# Patient Record
Sex: FEMALE | Race: WHITE | Employment: STUDENT | URBAN - METROPOLITAN AREA
[De-identification: names, ages, dates, MRNs, and addresses within clinical notes are randomized per-mention and may not be internally consistent; named-entity substitution may affect disease eponyms.]

---

## 2023-01-06 ENCOUNTER — OFFICE VISIT (OUTPATIENT)
Dept: PRIMARY CARE CLINIC | Age: 23
End: 2023-01-06
Payer: COMMERCIAL

## 2023-01-06 VITALS
HEART RATE: 83 BPM | HEIGHT: 66 IN | BODY MASS INDEX: 41.78 KG/M2 | SYSTOLIC BLOOD PRESSURE: 108 MMHG | WEIGHT: 260 LBS | OXYGEN SATURATION: 98 % | TEMPERATURE: 98.7 F | DIASTOLIC BLOOD PRESSURE: 73 MMHG

## 2023-01-06 DIAGNOSIS — M79.89 MASS OF SOFT TISSUE OF LEFT UPPER EXTREMITY: Primary | ICD-10-CM

## 2023-01-06 DIAGNOSIS — L68.0 HIRSUTISM: ICD-10-CM

## 2023-01-06 PROCEDURE — 99203 OFFICE O/P NEW LOW 30 MIN: CPT | Performed by: FAMILY MEDICINE

## 2023-01-06 RX ORDER — SPIRONOLACTONE 25 MG/1
25 TABLET ORAL DAILY
Qty: 30 TABLET | Refills: 5 | Status: SHIPPED | OUTPATIENT
Start: 2023-01-06

## 2023-01-06 SDOH — ECONOMIC STABILITY: FOOD INSECURITY: WITHIN THE PAST 12 MONTHS, YOU WORRIED THAT YOUR FOOD WOULD RUN OUT BEFORE YOU GOT MONEY TO BUY MORE.: NEVER TRUE

## 2023-01-06 SDOH — ECONOMIC STABILITY: FOOD INSECURITY: WITHIN THE PAST 12 MONTHS, THE FOOD YOU BOUGHT JUST DIDN'T LAST AND YOU DIDN'T HAVE MONEY TO GET MORE.: NEVER TRUE

## 2023-01-06 ASSESSMENT — PATIENT HEALTH QUESTIONNAIRE - PHQ9
SUM OF ALL RESPONSES TO PHQ QUESTIONS 1-9: 2
SUM OF ALL RESPONSES TO PHQ QUESTIONS 1-9: 2
SUM OF ALL RESPONSES TO PHQ9 QUESTIONS 1 & 2: 2
1. LITTLE INTEREST OR PLEASURE IN DOING THINGS: 0
SUM OF ALL RESPONSES TO PHQ QUESTIONS 1-9: 2
SUM OF ALL RESPONSES TO PHQ QUESTIONS 1-9: 2
2. FEELING DOWN, DEPRESSED OR HOPELESS: 2

## 2023-01-06 ASSESSMENT — SOCIAL DETERMINANTS OF HEALTH (SDOH): HOW HARD IS IT FOR YOU TO PAY FOR THE VERY BASICS LIKE FOOD, HOUSING, MEDICAL CARE, AND HEATING?: NOT HARD AT ALL

## 2023-01-06 NOTE — PROGRESS NOTES
[facility]  [facility] PRIMARY CARE  [address] [place] [address]  Dept: [phone]  Dept Fax: [phone]     1/6/2023      Matilde K Nanmainetommy   2000     Chief Complaint   Patient presents with    New Patient    Mass     Patient would like a referral for lump on left arm. Patient states lump is hard but not painful.       HPI    Pt comes in today for new patient visit.     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 Graduated from [employer] and recently moved to the area. She has no PMH.     C/o lump to right arm just above elbow. Just noticed in November. No change in size. No pain. Tattoo over that area was done in Sept 17th. Has a h/o of a similar type cyst to right upper abdomen that she had removed in 2022.     Patient has never had a pap smear. C/o unwanted facial hair along chin and lower face. Saw PCP last year and had labs checked but not testosterone. FSH, LH, prolactin and DHEA normal. Is wanting to start working on weight loss and would like referral to see weight loss provider/dietician.     PHQ-9 Total Score: 2 (1/6/2023 10:19 AM)       Prior to Visit Medications    Medication Sig Taking? Authorizing Provider   sertraline (ZOLOFT) 50 MG tablet TAKE 1 TABLET BY MOUTH DAILY  Oliverio Barrett, DO        History reviewed. No pertinent past medical history.     Social History     Tobacco Use    Smoking status: Never    Smokeless tobacco: Never   Substance Use Topics    Alcohol use: Yes    Drug use: Never        Past Surgical History:   Procedure Laterality Date    CYST REMOVAL      right upper abdomen        No Known Allergies     Family History   Problem Relation Age of Onset    Other Mother         epilepsy    No Known Problems Father     Cancer Maternal Aunt         NMSC        Patient's past medical history, surgical history, family history, medications, and allergies  were all reviewed and updated as appropriate today.    Review of Systems   Constitutional:   Negative for fever. Respiratory:  Negative for cough. Musculoskeletal:  Negative for arthralgias. Skin:  Negative for color change, rash and wound. /73   Pulse 83   Temp 98.7 °F (37.1 °C)   Ht 5' 5.5\" (1.664 m)   Wt 260 lb (117.9 kg)   SpO2 98%   BMI 42.61 kg/m²      Physical Exam  Vitals reviewed. Constitutional:       General: She is not in acute distress. Appearance: Normal appearance. She is well-developed. She is not ill-appearing or toxic-appearing. Eyes:      General: No scleral icterus. Conjunctiva/sclera: Conjunctivae normal.   Neck:      Thyroid: No thyromegaly. Cardiovascular:      Rate and Rhythm: Normal rate and regular rhythm. Heart sounds: No murmur heard. Pulmonary:      Effort: Pulmonary effort is normal. No respiratory distress. Breath sounds: Normal breath sounds. Abdominal:      General: There is no distension. Palpations: Abdomen is soft. Tenderness: There is no abdominal tenderness. Musculoskeletal:      Left forearm: Deformity (~ 4 x 3 cm soft tissue mass to JEF DAWKINS, somewhat firm) present. Cervical back: Neck supple. Right lower leg: No edema. Left lower leg: No edema. Lymphadenopathy:      Cervical: No cervical adenopathy. Skin:     General: Skin is warm and dry. Capillary Refill: Capillary refill takes less than 2 seconds. Neurological:      General: No focal deficit present. Mental Status: She is alert. Mental status is at baseline. Psychiatric:         Mood and Affect: Mood normal.       Assessment:  Encounter Diagnoses   Name Primary? Mass of soft tissue of left upper extremity Yes    Hirsutism     BMI 40.0-44.9, adult (HCC)        Plan:    - Recommend surgical referral.   - Check testosterone level. Trial spironolactone.    - Weight mgmt referral.     New Prescriptions    SPIRONOLACTONE (ALDACTONE) 25 MG TABLET    Take 1 tablet by mouth daily        Orders Placed This Encounter Procedures    Lenka Waldron MD, Hand Surgery (Hand, Wrist, Upper Extremity), Chao Villeda MD, Bariatric Surgery, (Virtual Visits Only)          Return in about 6 months (around 7/6/2023) for Annual physical.         Rolando Barnhart DO

## 2023-01-06 NOTE — PATIENT INSTRUCTIONS
[facility] Laboratory Locations - No appointment necessary. @ indicates the location is open Saturdays in addition to Monday through Friday. Call your preferred location for test preparation, business hours and other information you need. [employer] accepts [employer]. [facility]    @ [place] [facility] Svcs. 3 [facility] [address] [place] [address]. Rudy, [address]   Ph: [phone] [place] [facility] Svcs. [address]   Ph: [phone]  @ [place] [facility] Svcs. [address] [facility]   [facility]   Ph: [phone]    [facility] Svcs. [address]iza [address]   Ph: [phone] @ [place] [facility] Svcs. [address]  Ph: [phone] @ [place] [facility] Svcs. [address] [facility] [address] [facility] [address] Joni [address]   Ph: [phone]     [facility] Svcs. [address]  Ph: [phone]    [place]   @ [place] [facility] Svcs. [address] [address]   Ph: [phone] [facility] Med. Office Bldg. [address] [facility], [address]  Ph: [address] [place] [address]   [place] [address] Lab Svcs[address] 54 [facility]   Ph: [phone] [facility]. Lab Svcs.   [address] [facility], [address]   Ph: [phone]

## 2023-01-09 ASSESSMENT — ENCOUNTER SYMPTOMS
COLOR CHANGE: 0
COUGH: 0

## 2023-01-13 ENCOUNTER — OFFICE VISIT (OUTPATIENT)
Dept: ORTHOPEDIC SURGERY | Age: 23
End: 2023-01-13

## 2023-01-13 VITALS — WEIGHT: 260 LBS | HEIGHT: 66 IN | BODY MASS INDEX: 41.78 KG/M2

## 2023-01-13 DIAGNOSIS — R22.32 ARM MASS, LEFT: Primary | ICD-10-CM

## 2023-01-13 SDOH — HEALTH STABILITY: PHYSICAL HEALTH: ON AVERAGE, HOW MANY DAYS PER WEEK DO YOU ENGAGE IN MODERATE TO STRENUOUS EXERCISE (LIKE A BRISK WALK)?: 4 DAYS

## 2023-01-13 SDOH — HEALTH STABILITY: PHYSICAL HEALTH: ON AVERAGE, HOW MANY MINUTES DO YOU ENGAGE IN EXERCISE AT THIS LEVEL?: 30 MIN

## 2023-01-13 ASSESSMENT — SOCIAL DETERMINANTS OF HEALTH (SDOH)
WITHIN THE LAST YEAR, HAVE TO BEEN RAPED OR FORCED TO HAVE ANY KIND OF SEXUAL ACTIVITY BY YOUR PARTNER OR EX-PARTNER?: NO
WITHIN THE LAST YEAR, HAVE YOU BEEN HUMILIATED OR EMOTIONALLY ABUSED IN OTHER WAYS BY YOUR PARTNER OR EX-PARTNER?: NO
WITHIN THE LAST YEAR, HAVE YOU BEEN KICKED, HIT, SLAPPED, OR OTHERWISE PHYSICALLY HURT BY YOUR PARTNER OR EX-PARTNER?: NO
WITHIN THE LAST YEAR, HAVE YOU BEEN AFRAID OF YOUR PARTNER OR EX-PARTNER?: NO

## 2023-01-13 NOTE — PROGRESS NOTES
Jv Parks  [id number]  January 13, 2023    Chief Complaint   Patient presents with    Arm Pain     left       History: The patient is a 68-year-old female who is here for evaluation of her left arm. The patient reportedly developed a mass over the mid arm/humerus back in November. She cannot recall a specific injury. She woke up 1 morning and noted swelling. She denies any fever or chills. She reports minimal pain. This is a consult from [facility],  for a mass of the left arm. The patient's  past medical history, medications, allergies,  family history, social history, and have been reviewed, and dated and are recorded in the chart. Pertinent items are noted in HPI. Review of systems reviewed from Pertinent History Form dated on 1/13 and available in the patient's chart under the Media tab. Vitals:  Ht 5' 5.5\" (1.664 m)   Wt 260 lb (117.9 kg)   BMI 42.61 kg/m²     Physical: On examination today, the patient is alert and oriented x3. The patient has full range of motion of her left elbow, wrist and hand. She has full range of motion of the left shoulder. The patient does have a palpable mass over the mid upper arm which measures approximately 3 cm x 3 cm. The mass is in the subcutaneous tissues over the biceps. The mass is somewhat mobile. The margins of the mass are mildly tender. There is no evidence of erythema or warmth. Left elbow strength is 5/5. Examination of the skin reveals no lesions or ulcerations. The patient is neurovascularly intact distally. X-rays: 2 views of the left elbow/humerus obtained in the office today were extensively reviewed. There are no lytic or blastic lesions within the bone. There is no evidence of fracture or dislocation. 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 The patient will follow up with me after the MRI scan is completed.   We will then discuss our options. She was encouraged to modify her activities. Orders Placed This Encounter   Procedures    XR ELBOW LEFT (2 VIEWS)     Standing Status:   Future     Number of Occurrences:   1     Standing Expiration Date:   1/13/2024     Scheduling Instructions:      Rm 24      Mass on distal bicep     Order Specific Question:   Reason for exam:     Answer:   pain    MRI HUMERUS LEFT W CONTRAST     Standing Status:   Future     Standing Expiration Date:   1/13/2024     Scheduling Instructions:      [facility]     Order Specific Question:   STAT Creatinine as needed:     Answer:   Yes     Order Specific Question:   Reason for exam:     Answer:   to evaluate mass over distal third of bicep     Order Specific Question:   What is the sedation requirement?      Answer:   None

## 2023-01-16 ENCOUNTER — TELEPHONE (OUTPATIENT)
Dept: ORTHOPEDIC SURGERY | Age: 23
End: 2023-01-16

## 2023-01-16 NOTE — TELEPHONE ENCOUNTER
I left a message to inform the patient her MRI was approved. She was instructed to call Gemma vargas at [phone]. She will follow up in the office after the MRI to review results.      Adam Stark MA  P [facility] Clinical Support  MRI LEFT HUMERUS approved Auth# [id number]

## 2023-01-24 ENCOUNTER — HOSPITAL ENCOUNTER (OUTPATIENT)
Dept: MRI IMAGING | Age: 23
Discharge: HOME | End: 2023-01-24
Payer: COMMERCIAL

## 2023-01-24 ENCOUNTER — TELEPHONE (OUTPATIENT)
Dept: ORTHOPEDIC SURGERY | Age: 23
End: 2023-01-24

## 2023-01-24 DIAGNOSIS — R22.32 ARM MASS, LEFT: Primary | ICD-10-CM

## 2023-01-24 DIAGNOSIS — R22.32 ARM MASS, LEFT: ICD-10-CM

## 2023-01-24 PROCEDURE — 6360000004 HC RX CONTRAST MEDICATION: Performed by: ORTHOPAEDIC SURGERY

## 2023-01-24 PROCEDURE — A9579 GAD-BASE MR CONTRAST NOS,1ML: HCPCS | Performed by: ORTHOPAEDIC SURGERY

## 2023-01-24 PROCEDURE — 73219 MRI UPPER EXTREMITY W/DYE: CPT

## 2023-01-24 RX ADMIN — GADOTERIDOL 20 ML: 279.3 INJECTION, SOLUTION INTRAVENOUS at 12:04

## 2023-01-24 NOTE — TELEPHONE ENCOUNTER
I left a message for the patient to call back to discuss her MRI results. Dr. Lolis Villegas reviewed the MRI and would like the patient to see Dr. Neelam Isaacs at [facility] ([phone]) for further evaluation due to some concerns on the MRI.

## 2023-02-06 ENCOUNTER — OFFICE VISIT (OUTPATIENT)
Dept: PRIMARY CARE CLINIC | Age: 23
End: 2023-02-06
Payer: COMMERCIAL

## 2023-02-06 VITALS
SYSTOLIC BLOOD PRESSURE: 100 MMHG | HEART RATE: 77 BPM | OXYGEN SATURATION: 98 % | TEMPERATURE: 97.8 F | BODY MASS INDEX: 41.91 KG/M2 | HEIGHT: 66 IN | DIASTOLIC BLOOD PRESSURE: 68 MMHG | WEIGHT: 260.8 LBS

## 2023-02-06 DIAGNOSIS — Z01.818 PRE-OP EXAM: ICD-10-CM

## 2023-02-06 DIAGNOSIS — M79.89 MASS OF SOFT TISSUE OF LEFT UPPER EXTREMITY: Primary | ICD-10-CM

## 2023-02-06 PROCEDURE — 99213 OFFICE O/P EST LOW 20 MIN: CPT | Performed by: FAMILY MEDICINE

## 2023-02-06 SDOH — ECONOMIC STABILITY: FOOD INSECURITY: WITHIN THE PAST 12 MONTHS, THE FOOD YOU BOUGHT JUST DIDN'T LAST AND YOU DIDN'T HAVE MONEY TO GET MORE.: NEVER TRUE

## 2023-02-06 SDOH — ECONOMIC STABILITY: FOOD INSECURITY: WITHIN THE PAST 12 MONTHS, YOU WORRIED THAT YOUR FOOD WOULD RUN OUT BEFORE YOU GOT MONEY TO BUY MORE.: NEVER TRUE

## 2023-02-06 SDOH — ECONOMIC STABILITY: HOUSING INSECURITY
IN THE LAST 12 MONTHS, WAS THERE A TIME WHEN YOU DID NOT HAVE A STEADY PLACE TO SLEEP OR SLEPT IN A SHELTER (INCLUDING NOW)?: NO

## 2023-02-06 SDOH — ECONOMIC STABILITY: INCOME INSECURITY: HOW HARD IS IT FOR YOU TO PAY FOR THE VERY BASICS LIKE FOOD, HOUSING, MEDICAL CARE, AND HEATING?: NOT HARD AT ALL

## 2023-02-06 ASSESSMENT — ENCOUNTER SYMPTOMS
DIARRHEA: 0
NAUSEA: 0
SHORTNESS OF BREATH: 0
ABDOMINAL PAIN: 0
VOMITING: 0

## 2023-02-06 NOTE — PROGRESS NOTES
[address] [facility] [address] PRIMARY CARE  [address] [facility] [address]  Dept: [phone]  Dept Fax: [phone]     2/6/2023      Venu Coello   2000     Chief Complaint   Patient presents with    Pre-op Exam     Ortho    2/13/2023   POSSIBLE EXCISION LEFT ARM MASS       HPI    Pt comes in today for pre-op visit. Surgeon - Dr. Jeremias Gomez at [facility] [phone]    Diagnosis: Left arm mass    Planned surgery: Biopsy, possible excision    Date scheduled: 2/13/23    Anesthesia: General    Blood thinner/ASA/NSAIDs: None    H/o anesthesia complications: Has never had general anesthesia    Pre-operative testing: None ordered    H/o cardiac or pulmonary disease: None    Functional capacity: 8-10  ( <4 MET = Self care, ADLs, walking indoors, 3-5 MET = Light work around the house, climb stairs or walk up hill, 5-7 METs = Heavy cleaning around the house, moderate yard work, run a short distance, 8-10 METs = Moderate to strenuous recreational sports, daily or prolonged exercise)       Impression   1. Heterogeneous enhancing mass lesion measuring 2.8 x 2.6 x 1.8 cm subjacent   to the soft tissue marker along the anterior and mid to distal left upper arm   in the subcutaneous fat overlying the distal biceps muscle with loss of the   intervening fat plane. Differential considerations include malignant fibrous   histiocytoma or synovial sarcoma. Consultation with an orthopedic oncologist   is recommended prior to any intervention or sampling. 2. No acute osseous abnormality. The findings were sent to the Radiology Results [address] at 12:43   pm on 1/24/2023 to be communicated to a licensed caregiver. No data recorded     Prior to Visit Medications    Medication Sig Taking? Authorizing Provider   spironolactone (ALDACTONE) 25 MG tablet Take 1 tablet by mouth daily  [address], DO        History reviewed. No pertinent past medical history. Social History     Tobacco Use    Smoking status: Never    Smokeless tobacco: Never   Substance Use Topics    Alcohol use: Yes    Drug use: Never        Past Surgical History:   Procedure Laterality Date    CYST REMOVAL      right upper abdomen        No Known Allergies     Family History   Problem Relation Age of Onset    Other Mother         epilepsy    No Known Problems Father     Cancer Maternal Aunt         NMSC        Patient's past medical history, surgical history, family history, medications, and allergies  were all reviewed and updated as appropriate today. Review of Systems   Constitutional:  Negative for chills, fever and unexpected weight change. Respiratory:  Negative for shortness of breath. Cardiovascular:  Negative for chest pain. Gastrointestinal:  Negative for abdominal pain, diarrhea, nausea and vomiting. Neurological:  Negative for syncope. /68   Pulse 77   Temp 97.8 °F (36.6 °C)   Ht 5' 5.5\" (1.664 m)   Wt 260 lb 12.8 oz (118.3 kg)   LMP  (LMP Unknown) Comment: Last week of January 2023  SpO2 98%   BMI 42.74 kg/m²      Physical Exam  Vitals reviewed. Constitutional:       General: She is not in acute distress. Appearance: Normal appearance. She is well-developed. She is not ill-appearing or toxic-appearing. Eyes:      General: No scleral icterus. Conjunctiva/sclera: Conjunctivae normal.   Neck:      Thyroid: No thyromegaly. Cardiovascular:      Rate and Rhythm: Normal rate and regular rhythm. Heart sounds: No murmur heard. Pulmonary:      Effort: Pulmonary effort is normal. No respiratory distress. Breath sounds: Normal breath sounds. Musculoskeletal:      Cervical back: Neck supple. Right lower leg: No edema. Left lower leg: No edema. Lymphadenopathy:      Cervical: No cervical adenopathy. Skin:     General: Skin is warm and dry. Capillary Refill: Capillary refill takes less than 2 seconds.    Neurological: General: No focal deficit present. Mental Status: She is alert. Mental status is at baseline. Psychiatric:         Mood and Affect: Mood normal.       Assessment:  Encounter Diagnoses   Name Primary? Mass of soft tissue of left upper extremity Yes    Pre-op exam        Plan:    - Patient is medically cleared for planned surgery. New Prescriptions    No medications on file        No orders of the defined types were placed in this encounter. Return if symptoms worsen or fail to improve.          [address], DO

## 2023-02-07 ENCOUNTER — TELEPHONE (OUTPATIENT)
Dept: PRIMARY CARE CLINIC | Age: 23
End: 2023-02-07

## 2023-02-07 NOTE — TELEPHONE ENCOUNTER
Update: Morrell Lesches from [address] [place] [address] office called back and informed me that patient does not need any testing. She informed me that all we need to send them is her AVS.    Fax:[phone].

## 2023-02-07 NOTE — TELEPHONE ENCOUNTER
Called Dr. Nydia De Oliveira per Dr. Giulia Cali to see if patient needed any testing for pre op. I spoke with a representative, Radha Molina, who informed me that he will send a message to the clinical team and see if patient needs testing for procedure.

## 2023-02-08 PROBLEM — M79.89 MASS OF SOFT TISSUE OF LEFT UPPER EXTREMITY: Status: ACTIVE | Noted: 2023-02-08

## 2023-05-31 ENCOUNTER — OFFICE VISIT (OUTPATIENT)
Dept: PRIMARY CARE CLINIC | Age: 23
End: 2023-05-31
Payer: COMMERCIAL

## 2023-05-31 VITALS
TEMPERATURE: 98.6 F | HEART RATE: 70 BPM | SYSTOLIC BLOOD PRESSURE: 100 MMHG | DIASTOLIC BLOOD PRESSURE: 67 MMHG | WEIGHT: 255.6 LBS | BODY MASS INDEX: 41.89 KG/M2

## 2023-05-31 DIAGNOSIS — Z01.419 WELL WOMAN EXAM WITH ROUTINE GYNECOLOGICAL EXAM: Primary | ICD-10-CM

## 2023-05-31 PROBLEM — M79.89 MASS OF SOFT TISSUE OF LEFT UPPER EXTREMITY: Status: RESOLVED | Noted: 2023-02-08 | Resolved: 2023-05-31

## 2023-05-31 PROCEDURE — 90715 TDAP VACCINE 7 YRS/> IM: CPT | Performed by: FAMILY MEDICINE

## 2023-05-31 PROCEDURE — 99395 PREV VISIT EST AGE 18-39: CPT | Performed by: FAMILY MEDICINE

## 2023-05-31 PROCEDURE — 90471 IMMUNIZATION ADMIN: CPT | Performed by: FAMILY MEDICINE

## 2023-05-31 ASSESSMENT — ENCOUNTER SYMPTOMS: ABDOMINAL PAIN: 0

## 2023-05-31 NOTE — PROGRESS NOTES
[address] [facility] [address] PRIMARY CARE  [address] [place] [address]  Dept: [phone]  Dept Fax: [phone]     5/31/2023      Rachel Coello   2000     Chief Complaint   Patient presents with    Gynecologic Exam       HPI    Pt comes in today for WWE/PAP. Never had pap before. Would like GC/CT screening. No pelvic complaints. G0. Not on OCP. Menses regular. No data recorded     Prior to Visit Medications    Not on File        History reviewed. No pertinent past medical history. Social History     Tobacco Use    Smoking status: Never    Smokeless tobacco: Never   Substance Use Topics    Alcohol use: Yes     Alcohol/week: 0.0 - 1.0 standard drinks    Drug use: Never        Past Surgical History:   Procedure Laterality Date    CYST REMOVAL      right upper abdomen    CYST REMOVAL      Left arm - bengin pathology        No Known Allergies     Family History   Problem Relation Age of Onset    Other Mother         Epilepsy    No Known Problems Father     Cancer Maternal Aunt         NMSC    Diabetes Maternal Grandfather     Cancer Paternal Grandmother         Patient's past medical history, surgical history, family history, medications, and allergies  were all reviewed and updated as appropriate today. Review of Systems   Constitutional:  Negative for fever. Gastrointestinal:  Negative for abdominal pain. Genitourinary:  Negative for menstrual problem, pelvic pain and vaginal discharge. /67   Pulse 70   Temp 98.6 °F (37 °C)   Wt 255 lb 9.6 oz (115.9 kg)   LMP 05/16/2023   BMI 41.89 kg/m²      Physical Exam  Vitals reviewed. Exam conducted with a chaperone present. Constitutional:       General: She is not in acute distress. Appearance: She is well-developed. She is not ill-appearing. HENT:      Head: Normocephalic. Eyes:      General: No scleral icterus.      Conjunctiva/sclera: Conjunctivae normal.   Neck:      Thyroid: No

## 2023-06-01 LAB
C TRACH DNA CVX QL NAA+PROBE: NEGATIVE
N GONORRHOEA DNA CERV MUCUS QL NAA+PROBE: NEGATIVE

## 2023-08-21 ENCOUNTER — OFFICE VISIT (OUTPATIENT)
Dept: PRIMARY CARE CLINIC | Age: 23
End: 2023-08-21
Payer: COMMERCIAL

## 2023-08-21 VITALS
DIASTOLIC BLOOD PRESSURE: 64 MMHG | SYSTOLIC BLOOD PRESSURE: 97 MMHG | BODY MASS INDEX: 41.99 KG/M2 | TEMPERATURE: 98.3 F | HEART RATE: 76 BPM | WEIGHT: 256.2 LBS

## 2023-08-21 DIAGNOSIS — N89.8 VAGINAL DISCHARGE: Primary | ICD-10-CM

## 2023-08-21 PROCEDURE — 99213 OFFICE O/P EST LOW 20 MIN: CPT | Performed by: FAMILY MEDICINE

## 2023-08-22 LAB
CANDIDA DNA VAG QL NAA+PROBE: ABNORMAL
G VAGINALIS DNA SPEC QL NAA+PROBE: ABNORMAL
T VAGINALIS DNA VAG QL NAA+PROBE: ABNORMAL

## 2023-08-22 RX ORDER — METRONIDAZOLE 500 MG/1
500 TABLET ORAL 2 TIMES DAILY
Qty: 14 TABLET | Refills: 0 | Status: SHIPPED | OUTPATIENT
Start: 2023-08-22 | End: 2023-08-29

## 2023-08-24 NOTE — PROGRESS NOTES
Neurological:      Mental Status: She is alert. Assessment:  Encounter Diagnosis   Name Primary? Vaginal discharge Yes       Plan:    - Vaginal culture sent. Will treat pending results. New Prescriptions    No medications on file   These medications were prescribed by a provider not a part of this encounter    METRONIDAZOLE (FLAGYL) 500 MG TABLET    Take 1 tablet by mouth 2 times daily for 7 days        Orders Placed This Encounter   Procedures    Vaginal Pathogens Probes *A        Return if symptoms worsen or fail to improve.          [address], DO

## 2023-12-12 ENCOUNTER — OFFICE VISIT (OUTPATIENT)
Dept: PRIMARY CARE CLINIC | Age: 23
End: 2023-12-12
Payer: COMMERCIAL

## 2023-12-12 VITALS
OXYGEN SATURATION: 97 % | WEIGHT: 254 LBS | SYSTOLIC BLOOD PRESSURE: 119 MMHG | HEART RATE: 87 BPM | BODY MASS INDEX: 41.62 KG/M2 | DIASTOLIC BLOOD PRESSURE: 76 MMHG

## 2023-12-12 DIAGNOSIS — N89.8 VAGINAL DISCHARGE: ICD-10-CM

## 2023-12-12 DIAGNOSIS — N92.6 IRREGULAR MENSTRUAL BLEEDING: Primary | ICD-10-CM

## 2023-12-12 DIAGNOSIS — B37.2 SKIN CANDIDIASIS: ICD-10-CM

## 2023-12-12 PROCEDURE — 99214 OFFICE O/P EST MOD 30 MIN: CPT | Performed by: FAMILY MEDICINE

## 2023-12-12 RX ORDER — FLUCONAZOLE 150 MG/1
150 TABLET ORAL ONCE
Qty: 1 TABLET | Refills: 0 | Status: SHIPPED | OUTPATIENT
Start: 2023-12-12 | End: 2023-12-12

## 2023-12-13 DIAGNOSIS — N76.0 BACTERIAL VAGINOSIS: Primary | ICD-10-CM

## 2023-12-13 DIAGNOSIS — B96.89 BACTERIAL VAGINOSIS: Primary | ICD-10-CM

## 2023-12-13 RX ORDER — METRONIDAZOLE 500 MG/1
500 TABLET ORAL 2 TIMES DAILY
Qty: 14 TABLET | Refills: 0 | Status: SHIPPED | OUTPATIENT
Start: 2023-12-13 | End: 2023-12-20

## 2023-12-14 LAB
C TRACH DNA CVX QL NAA+PROBE: NEGATIVE
N GONORRHOEA DNA CERV MUCUS QL NAA+PROBE: NEGATIVE

## 2024-02-06 ENCOUNTER — OFFICE VISIT (OUTPATIENT)
Dept: PRIMARY CARE CLINIC | Age: 24
End: 2024-02-06
Payer: COMMERCIAL

## 2024-02-06 VITALS
TEMPERATURE: 97.8 F | WEIGHT: 249.4 LBS | SYSTOLIC BLOOD PRESSURE: 107 MMHG | BODY MASS INDEX: 40.87 KG/M2 | HEART RATE: 75 BPM | DIASTOLIC BLOOD PRESSURE: 74 MMHG

## 2024-02-06 DIAGNOSIS — Z01.10 NORMAL EAR EXAM: Primary | ICD-10-CM

## 2024-02-06 PROCEDURE — 99212 OFFICE O/P EST SF 10 MIN: CPT | Performed by: FAMILY MEDICINE

## 2024-02-06 ASSESSMENT — PATIENT HEALTH QUESTIONNAIRE - PHQ9
SUM OF ALL RESPONSES TO PHQ9 QUESTIONS 1 & 2: 0
SUM OF ALL RESPONSES TO PHQ QUESTIONS 1-9: 0
1. LITTLE INTEREST OR PLEASURE IN DOING THINGS: 0
2. FEELING DOWN, DEPRESSED OR HOPELESS: 0
SUM OF ALL RESPONSES TO PHQ QUESTIONS 1-9: 0

## 2024-02-06 NOTE — PROGRESS NOTES
[facility]  [facility] PRIMARY CARE  [address] [place] [address]  Dept: [phone]  Dept Fax: [phone]     2/6/2024      Matilde Topetetommy   2000     Chief Complaint   Patient presents with    Cerumen Impaction       HPI    Pt comes in today for possible ear wax. Has a friend who bought a camera device to look in ears and was told she had ear wax. No pain.     PHQ-9 Total Score: 0 (2/6/2024 11:23 AM)       Prior to Visit Medications    Not on File        No past medical history on file.     Social History     Tobacco Use    Smoking status: Never    Smokeless tobacco: Never   Substance Use Topics    Alcohol use: Yes     Alcohol/week: 0.0 - 1.0 standard drinks of alcohol    Drug use: Never        Past Surgical History:   Procedure Laterality Date    CYST REMOVAL      right upper abdomen    CYST REMOVAL      Left arm - bengin pathology        No Known Allergies     Family History   Problem Relation Age of Onset    Other Mother         Epilepsy    No Known Problems Father     Cancer Maternal Aunt         NMSC    Diabetes Maternal Grandfather     Cancer Paternal Grandmother         Patient's past medical history, surgical history, family history, medications, and allergies  were all reviewed and updated as appropriate today.    Review of Systems   Constitutional:  Negative for fever.   HENT:  Negative for ear pain.        /74   Pulse 75   Temp 97.8 °F (36.6 °C)   Wt 113.1 kg (249 lb 6.4 oz)   BMI 40.87 kg/m²      Physical Exam  HENT:      Right Ear: Tympanic membrane, ear canal and external ear normal. There is no impacted cerumen.      Left Ear: Tympanic membrane, ear canal and external ear normal. There is no impacted cerumen.         Assessment:  Encounter Diagnosis   Name Primary?    Normal ear exam Yes       Plan:    - EAC and TM normal bilaterally. Reassured.     New Prescriptions    No medications on file        No orders of the defined types were placed

## 2024-06-03 ENCOUNTER — OFFICE VISIT (OUTPATIENT)
Dept: PRIMARY CARE CLINIC | Age: 24
End: 2024-06-03
Payer: COMMERCIAL

## 2024-06-03 VITALS
HEART RATE: 77 BPM | OXYGEN SATURATION: 98 % | WEIGHT: 247.8 LBS | BODY MASS INDEX: 40.61 KG/M2 | DIASTOLIC BLOOD PRESSURE: 67 MMHG | SYSTOLIC BLOOD PRESSURE: 101 MMHG

## 2024-06-03 DIAGNOSIS — Z00.00 ROUTINE PHYSICAL EXAMINATION: Primary | ICD-10-CM

## 2024-06-03 PROCEDURE — 99395 PREV VISIT EST AGE 18-39: CPT | Performed by: FAMILY MEDICINE

## 2024-06-03 SDOH — ECONOMIC STABILITY: FOOD INSECURITY: WITHIN THE PAST 12 MONTHS, YOU WORRIED THAT YOUR FOOD WOULD RUN OUT BEFORE YOU GOT MONEY TO BUY MORE.: NEVER TRUE

## 2024-06-03 SDOH — ECONOMIC STABILITY: FOOD INSECURITY: WITHIN THE PAST 12 MONTHS, THE FOOD YOU BOUGHT JUST DIDN'T LAST AND YOU DIDN'T HAVE MONEY TO GET MORE.: NEVER TRUE

## 2024-06-03 SDOH — ECONOMIC STABILITY: INCOME INSECURITY: HOW HARD IS IT FOR YOU TO PAY FOR THE VERY BASICS LIKE FOOD, HOUSING, MEDICAL CARE, AND HEATING?: NOT HARD AT ALL

## 2024-06-03 ASSESSMENT — ENCOUNTER SYMPTOMS
VOMITING: 0
DIARRHEA: 0
NAUSEA: 0
ABDOMINAL PAIN: 0
SHORTNESS OF BREATH: 0

## 2024-06-03 NOTE — PROGRESS NOTES
[facility]  [facility] PRIMARY CARE  [address] [place] [address]  Dept: [phone]  Dept Fax: [phone]     6/3/2024      Matilde Topetetommy   2000     Chief Complaint   Patient presents with    Annual Exam       HPI    Pt comes in today for annual physical.     UTD on pap 2023. Due 2026. No acute concerns. Cycle 30-32 days. Lasts typically 5 days on average.       No data recorded     Prior to Visit Medications    Not on File        History reviewed. No pertinent past medical history.     Social History     Tobacco Use    Smoking status: Never    Smokeless tobacco: Never   Substance Use Topics    Alcohol use: Yes     Alcohol/week: 0.0 - 1.0 standard drinks of alcohol    Drug use: Never        Past Surgical History:   Procedure Laterality Date    CYST REMOVAL      right upper abdomen    CYST REMOVAL      Left arm - bengin pathology        No Known Allergies     Family History   Problem Relation Age of Onset    Other Mother         Epilepsy    No Known Problems Father     Cancer Maternal Aunt         NMSC    Diabetes Maternal Grandfather     Cancer Paternal Grandmother         Patient's past medical history, surgical history, family history, medications, and allergies  were all reviewed and updated as appropriate today.    Review of Systems   Constitutional:  Negative for chills, fever and unexpected weight change.   Respiratory:  Negative for shortness of breath.    Cardiovascular:  Negative for chest pain.   Gastrointestinal:  Negative for abdominal pain, diarrhea, nausea and vomiting.   Genitourinary:  Negative for menstrual problem.       /67   Pulse 77   Wt 112.4 kg (247 lb 12.8 oz)   LMP 05/31/2024   SpO2 98%   BMI 40.61 kg/m²      Physical Exam  Vitals reviewed.   Constitutional:       General: She is not in acute distress.     Appearance: Normal appearance. She is well-developed. She is not ill-appearing or toxic-appearing.   HENT:      Right Ear: 
Alcoholic intoxication, uncomplicated

## 2024-06-03 NOTE — PATIENT INSTRUCTIONS
[facility] Laboratory Locations - No appointment necessary.  ? indicates the location is open Saturdays in addition to Monday through Friday.   Call your preferred location for test preparation, business hours and other information you need.   [facility] accepts all insurances.  [place]AL  [place]  [place]    ? Brenda   [address]   [address]   [place] [address]    Ph: [phone]  [facility] MOB   [address] [place] [address]    Ph: [phone]   ? Samir   [address]    [place] [address]    Ph: [phone]     [facility]   [address] [place] [address]    Ph: [phone] ? [address] [place] [address]   Ph: [phone]  ? [facility]   [address] [place] [address]    Ph: [phone]      [place]   [address] [place] [address]   Ph: [phone]    NORTH    ? [facility]   [address] [place] [address]    Ph: [phone]  [facility]   [address] [place] [address]   Ph: [phone]  [place]   [address] [facility], [address]    PH: [phone]    [facility]   [address] [place] [address]    Ph: [phone]  [place]  [address] [place] [address]  Ph: [phone]  [facility]   [address] [place] [address]    Ph: [phone]